# Patient Record
Sex: FEMALE | Race: WHITE
[De-identification: names, ages, dates, MRNs, and addresses within clinical notes are randomized per-mention and may not be internally consistent; named-entity substitution may affect disease eponyms.]

---

## 2017-03-13 ENCOUNTER — HOSPITAL ENCOUNTER (EMERGENCY)
Dept: HOSPITAL 26 - MED | Age: 22
Discharge: HOME | End: 2017-03-13
Payer: COMMERCIAL

## 2017-03-13 VITALS — SYSTOLIC BLOOD PRESSURE: 116 MMHG | DIASTOLIC BLOOD PRESSURE: 63 MMHG

## 2017-03-13 VITALS — SYSTOLIC BLOOD PRESSURE: 128 MMHG | DIASTOLIC BLOOD PRESSURE: 87 MMHG

## 2017-03-13 VITALS — WEIGHT: 140 LBS | HEIGHT: 62 IN | BODY MASS INDEX: 25.76 KG/M2

## 2017-03-13 DIAGNOSIS — K59.00: Primary | ICD-10-CM

## 2017-03-13 DIAGNOSIS — R03.0: ICD-10-CM

## 2017-03-13 PROCEDURE — 81002 URINALYSIS NONAUTO W/O SCOPE: CPT

## 2017-03-13 PROCEDURE — 96374 THER/PROPH/DIAG INJ IV PUSH: CPT

## 2017-03-13 PROCEDURE — 81025 URINE PREGNANCY TEST: CPT

## 2017-03-13 PROCEDURE — 99284 EMERGENCY DEPT VISIT MOD MDM: CPT

## 2017-03-13 PROCEDURE — 74022 RADEX COMPL AQT ABD SERIES: CPT

## 2017-03-13 PROCEDURE — 96375 TX/PRO/DX INJ NEW DRUG ADDON: CPT

## 2017-03-13 PROCEDURE — 96361 HYDRATE IV INFUSION ADD-ON: CPT

## 2017-03-13 NOTE — NUR
Patient discharged with v/s stable. Written and verbal after care instructions 
given and explained.Patient alert, oriented and verbalized understanding of 
instructions. Ambulatory with steady gait. All questions addressed prior to 
discharge. ID band removed. Patient advised to follow up with PMD. Rx of 
LACTULOSE, MAGNESIUM CITRATE, TRAMADOL given. Patient educated on indication of 
medication including possible reaction and side effects. Opportunity to ask 
questions provided and answered.

## 2017-03-13 NOTE — NUR
PATIENT PRESENTS TO ED WITH RLQ ABD PAIN X1 HOUR; DENIES V/D; SKIN IS 
PINK/WARM/DRY; AAOX4 WITH EVEN AND STEADY GAIT; LUNGS CLEAR BL; HR EVEN AND 
REGULAR; PT DENIES ANY FEVER, CP, SOB, OR COUGH AT THIS TIME; PATIENT STATES 
PAIN OF 10/10 AT THIS TIME; VSS; PATIENT POSITIONED FOR COMFORT; HOB ELEVATED; 
BEDRAILS UP X2; BED DOWN. ER MD MADE AWARE OF PT STATUS.

## 2017-08-07 ENCOUNTER — HOSPITAL ENCOUNTER (EMERGENCY)
Dept: HOSPITAL 26 - MED | Age: 22
Discharge: HOME | End: 2017-08-07
Payer: COMMERCIAL

## 2017-08-07 VITALS — HEIGHT: 57 IN | BODY MASS INDEX: 29.34 KG/M2 | WEIGHT: 136 LBS

## 2017-08-07 VITALS — SYSTOLIC BLOOD PRESSURE: 139 MMHG | DIASTOLIC BLOOD PRESSURE: 91 MMHG

## 2017-08-07 VITALS — DIASTOLIC BLOOD PRESSURE: 69 MMHG | SYSTOLIC BLOOD PRESSURE: 118 MMHG

## 2017-08-07 DIAGNOSIS — R11.2: ICD-10-CM

## 2017-08-07 DIAGNOSIS — R51: Primary | ICD-10-CM

## 2017-08-07 DIAGNOSIS — H53.149: ICD-10-CM

## 2017-08-07 PROCEDURE — 99284 EMERGENCY DEPT VISIT MOD MDM: CPT

## 2017-08-07 PROCEDURE — 70450 CT HEAD/BRAIN W/O DYE: CPT

## 2017-08-07 PROCEDURE — 96372 THER/PROPH/DIAG INJ SC/IM: CPT

## 2017-08-07 NOTE — NUR
Patient discharged with v/s stable. Written and verbal after care instructions 
given and explained. Patient alert, oriented and verbalized understanding of 
instructions. Ambulatory with steady gait. All questions addressed prior to 
discharge. ID band removed. Patient advised to follow up with PMD OR RETURN TO 
ER IF CONDITION WORSENS. Rx of  BENADRYL AND COMPAZINE given. Patient educated 
on indication of medication including possible reaction and side effects. 
Opportunity to ask questions provided and answered.

## 2017-08-07 NOTE — NUR
20 Y/O F C/O HEADACHE, N/V X 1 WK  PT SEEN PMD ON 8/4, HAS RX FOR IMITREX, 
SUDOGEST AND PROMETHAZINE. MED HX: NONE. PT RESTING COMFORTABLE, ER MD MADE 
AWARE.

## 2018-01-20 ENCOUNTER — HOSPITAL ENCOUNTER (EMERGENCY)
Dept: HOSPITAL 26 - MED | Age: 23
LOS: 1 days | Discharge: HOME | End: 2018-01-21
Payer: COMMERCIAL

## 2018-01-20 VITALS — DIASTOLIC BLOOD PRESSURE: 93 MMHG | SYSTOLIC BLOOD PRESSURE: 139 MMHG

## 2018-01-20 VITALS — HEIGHT: 55 IN | WEIGHT: 135.37 LBS | BODY MASS INDEX: 31.33 KG/M2

## 2018-01-20 DIAGNOSIS — R11.0: ICD-10-CM

## 2018-01-20 DIAGNOSIS — R10.9: Primary | ICD-10-CM

## 2018-01-20 LAB
ERYTHROCYTE [DISTWIDTH] IN BLOOD BY AUTOMATED COUNT: 12.8 % (ref 11.6–13.7)
HCT VFR BLD AUTO: 39.5 % (ref 36–48)
HGB BLD-MCNC: 13.2 G/DL (ref 12–16)
MCH RBC QN AUTO: 28 PG (ref 27–31)
MCHC RBC AUTO-ENTMCNC: 33 G/DL (ref 33–37)
MCV RBC AUTO: 84 FL (ref 80–94)
PLATELET # BLD AUTO: 307 K/UL (ref 140–450)
RBC # BLD AUTO: 4.7 MIL/UL (ref 4.2–5.4)
WBC # BLD AUTO: 9.9 K/UL (ref 4.8–10.8)

## 2018-01-20 PROCEDURE — 84702 CHORIONIC GONADOTROPIN TEST: CPT

## 2018-01-20 PROCEDURE — 80305 DRUG TEST PRSMV DIR OPT OBS: CPT

## 2018-01-20 PROCEDURE — 80053 COMPREHEN METABOLIC PANEL: CPT

## 2018-01-20 PROCEDURE — 85025 COMPLETE CBC W/AUTO DIFF WBC: CPT

## 2018-01-20 PROCEDURE — 96361 HYDRATE IV INFUSION ADD-ON: CPT

## 2018-01-20 PROCEDURE — 74177 CT ABD & PELVIS W/CONTRAST: CPT

## 2018-01-20 PROCEDURE — 81025 URINE PREGNANCY TEST: CPT

## 2018-01-20 PROCEDURE — G0482 DRUG TEST DEF 15-21 CLASSES: HCPCS

## 2018-01-20 PROCEDURE — 36415 COLL VENOUS BLD VENIPUNCTURE: CPT

## 2018-01-20 PROCEDURE — 83690 ASSAY OF LIPASE: CPT

## 2018-01-20 PROCEDURE — 96374 THER/PROPH/DIAG INJ IV PUSH: CPT

## 2018-01-20 PROCEDURE — 81001 URINALYSIS AUTO W/SCOPE: CPT

## 2018-01-20 PROCEDURE — 99285 EMERGENCY DEPT VISIT HI MDM: CPT

## 2018-01-20 PROCEDURE — 83605 ASSAY OF LACTIC ACID: CPT

## 2018-01-20 PROCEDURE — 96375 TX/PRO/DX INJ NEW DRUG ADDON: CPT

## 2018-01-21 VITALS — DIASTOLIC BLOOD PRESSURE: 85 MMHG | SYSTOLIC BLOOD PRESSURE: 130 MMHG

## 2018-01-21 LAB
ALBUMIN FLD-MCNC: 4.2 G/DL (ref 3.4–5)
ANION GAP SERPL CALCULATED.3IONS-SCNC: 10.8 MMOL/L (ref 8–16)
APPEARANCE UR: CLEAR
AST SERPL-CCNC: 15 U/L (ref 15–37)
BARBITURATES UR QL SCN: NEGATIVE NG/ML
BENZODIAZ UR QL SCN: NEGATIVE NG/ML
BILIRUB SERPL-MCNC: 0.3 MG/DL (ref 0–1)
BILIRUB UR QL STRIP: NEGATIVE
BUN SERPL-MCNC: 10 MG/DL (ref 7–18)
BZE UR QL SCN: NEGATIVE NG/ML
CANNABINOIDS UR QL SCN: NEGATIVE NG/ML
CHLORIDE SERPL-SCNC: 103 MMOL/L (ref 98–107)
CO2 SERPL-SCNC: 28.7 MMOL/L (ref 21–32)
COLOR UR: YELLOW
CREAT SERPL-MCNC: 1 MG/DL (ref 0.6–1.3)
GFR SERPL CREATININE-BSD FRML MDRD: 89 ML/MIN (ref 90–?)
GLUCOSE SERPL-MCNC: 111 MG/DL (ref 74–106)
GLUCOSE UR STRIP-MCNC: NEGATIVE MG/DL
HGB UR QL STRIP: (no result)
LEUKOCYTE ESTERASE UR QL STRIP: NEGATIVE
LIPASE SERPL-CCNC: 160 U/L (ref 73–393)
LYMPHOCYTES NFR BLD MANUAL: 14 % (ref 20–46)
MONOCYTES NFR BLD MANUAL: 3 % (ref 5–12)
NITRITE UR QL STRIP: NEGATIVE
OPIATES UR QL SCN: NEGATIVE NG/ML
PCP UR QL SCN: NEGATIVE NG/ML
PH UR STRIP: 7 [PH] (ref 5–9)
POTASSIUM SERPL-SCNC: 3.5 MMOL/L (ref 3.5–5.1)
RBC #/AREA URNS HPF: (no result) /HPF (ref 0–5)
SODIUM SERPL-SCNC: 139 MMOL/L (ref 136–145)
WBC,URINE: (no result) /HPF (ref 0–5)

## 2018-01-21 NOTE — NUR
Patient discharged with v/s stable. Pt states her pain is 0/10 at this time. 
Written and verbal after care instructions given and explained. 

Patient verbalized understanding. Ambulatory with steady gait. All questions 
addressed prior to discharge. Advised to follow up with PMD.

## 2018-01-21 NOTE — NUR
PT URINE NOT COLLECTED. PT UNABLE TO PROVIDE SAMPLE. DR HERRERA GAVE ORDER FOR 
HCG ANALYSIS VIA BLOOD SAMPLE. PT IN BED RESTING WITH HOB ELEVATED. FATER AT 
BEDSIDE. PAIN DECREASED TO 5/10. A&OX4. MD AWARE. CONTINUE TO MONITOR.

## 2018-01-21 NOTE — NUR
Md at bedside. Performed bedside US of bladder. Awaiting labs. continue to 
monitor. Pt states she is confortable. Father at bedside. Pt in poc with HOB 
elevated. Continue to monitor.

## 2018-01-21 NOTE — NUR
PT ARRIVED AT ED WITH FATER C/O UPPER ABDOMINAL AND LOWER BACK PAIN SINCE LAST 
NIGHT. PT STATES PAIN WITH AMBULATION. ABDOMEN SOFT AND TENDER. VSS. PT DENIES 
N/V/D AND STATES HAVING NORMAL GI AND  HABBITS. PT IN BED IN POC WITH HOB 
ELEVATED. MD AWARE. CONTINUE TO MONITOR.

## 2018-06-07 ENCOUNTER — HOSPITAL ENCOUNTER (EMERGENCY)
Dept: HOSPITAL 26 - MED | Age: 23
LOS: 1 days | Discharge: HOME | End: 2018-06-08
Payer: COMMERCIAL

## 2018-06-07 VITALS — BODY MASS INDEX: 26.91 KG/M2 | HEIGHT: 61 IN | WEIGHT: 142.5 LBS

## 2018-06-07 VITALS — SYSTOLIC BLOOD PRESSURE: 139 MMHG | DIASTOLIC BLOOD PRESSURE: 82 MMHG

## 2018-06-07 DIAGNOSIS — R10.12: Primary | ICD-10-CM

## 2018-06-07 LAB
ALBUMIN FLD-MCNC: 3.9 G/DL (ref 3.4–5)
ANION GAP SERPL CALCULATED.3IONS-SCNC: 11.8 MMOL/L (ref 8–16)
APPEARANCE UR: (no result)
AST SERPL-CCNC: 19 U/L (ref 15–37)
BASOPHILS # BLD AUTO: 0 K/UL (ref 0–0.22)
BASOPHILS NFR BLD AUTO: 0.3 % (ref 0–2)
BILIRUB SERPL-MCNC: 0.1 MG/DL (ref 0–1)
BILIRUB UR QL STRIP: NEGATIVE
BUN SERPL-MCNC: 11 MG/DL (ref 7–18)
CHLORIDE SERPL-SCNC: 104 MMOL/L (ref 98–107)
CO2 SERPL-SCNC: 29.5 MMOL/L (ref 21–32)
COLOR UR: YELLOW
CREAT SERPL-MCNC: 0.8 MG/DL (ref 0.6–1.3)
EOSINOPHIL # BLD AUTO: 0.1 K/UL (ref 0–0.4)
EOSINOPHIL NFR BLD AUTO: 0.7 % (ref 0–4)
ERYTHROCYTE [DISTWIDTH] IN BLOOD BY AUTOMATED COUNT: 13.8 % (ref 11.6–13.7)
GFR SERPL CREATININE-BSD FRML MDRD: 115 ML/MIN (ref 90–?)
GLUCOSE SERPL-MCNC: 114 MG/DL (ref 74–106)
GLUCOSE UR STRIP-MCNC: NEGATIVE MG/DL
HCT VFR BLD AUTO: 39.7 % (ref 36–48)
HGB BLD-MCNC: 12.9 G/DL (ref 12–16)
HGB UR QL STRIP: (no result)
LEUKOCYTE ESTERASE UR QL STRIP: NEGATIVE
LIPASE SERPL-CCNC: 285 U/L (ref 73–393)
LYMPHOCYTES # BLD AUTO: 1.3 K/UL (ref 2.5–16.5)
LYMPHOCYTES NFR BLD AUTO: 16.9 % (ref 20.5–51.1)
MCH RBC QN AUTO: 27 PG (ref 27–31)
MCHC RBC AUTO-ENTMCNC: 32 G/DL (ref 33–37)
MCV RBC AUTO: 84.2 FL (ref 80–94)
MONOCYTES # BLD AUTO: 0.5 K/UL (ref 0.8–1)
MONOCYTES NFR BLD AUTO: 6.6 % (ref 1.7–9.3)
NEUTROPHILS # BLD AUTO: 6 K/UL (ref 1.8–7.7)
NEUTROPHILS NFR BLD AUTO: 75.5 % (ref 42.2–75.2)
NITRITE UR QL STRIP: NEGATIVE
PH UR STRIP: 7.5 [PH] (ref 5–9)
PLATELET # BLD AUTO: 332 K/UL (ref 140–450)
POTASSIUM SERPL-SCNC: 4.3 MMOL/L (ref 3.5–5.1)
RBC # BLD AUTO: 4.71 MIL/UL (ref 4.2–5.4)
RBC #/AREA URNS HPF: (no result) /HPF (ref 0–5)
SODIUM SERPL-SCNC: 141 MMOL/L (ref 136–145)
WBC # BLD AUTO: 7.9 K/UL (ref 4.8–10.8)
WBC,URINE: (no result) /HPF (ref 0–5)

## 2018-06-07 PROCEDURE — 85025 COMPLETE CBC W/AUTO DIFF WBC: CPT

## 2018-06-07 PROCEDURE — S0119 ONDANSETRON 4 MG: HCPCS

## 2018-06-07 PROCEDURE — 96372 THER/PROPH/DIAG INJ SC/IM: CPT

## 2018-06-07 PROCEDURE — 36415 COLL VENOUS BLD VENIPUNCTURE: CPT

## 2018-06-07 PROCEDURE — 87086 URINE CULTURE/COLONY COUNT: CPT

## 2018-06-07 PROCEDURE — 99285 EMERGENCY DEPT VISIT HI MDM: CPT

## 2018-06-07 PROCEDURE — 84703 CHORIONIC GONADOTROPIN ASSAY: CPT

## 2018-06-07 PROCEDURE — 83690 ASSAY OF LIPASE: CPT

## 2018-06-07 PROCEDURE — 81025 URINE PREGNANCY TEST: CPT

## 2018-06-07 PROCEDURE — 81001 URINALYSIS AUTO W/SCOPE: CPT

## 2018-06-07 PROCEDURE — 74176 CT ABD & PELVIS W/O CONTRAST: CPT

## 2018-06-07 PROCEDURE — 80053 COMPREHEN METABOLIC PANEL: CPT

## 2018-06-07 NOTE — NUR
PT C/O LUQ PAIN X4 HOURS. 

ABD IS ROUND, SOFT, NON TENDER, HYPOACTIVE BS X4. PT STATES PAIN 10/0 RADIATING 
TO LEFT FLANK. 



NO PMH, NKDA

## 2018-06-08 VITALS — SYSTOLIC BLOOD PRESSURE: 130 MMHG | DIASTOLIC BLOOD PRESSURE: 80 MMHG

## 2018-06-08 NOTE — NUR
Patient discharged with v/s stable. Written and verbal after care instructions 
given and explained. 

Patient alert, oriented and verbalized understanding of instructions. 
Ambulatory with steady gait. All questions addressed prior to discharge. ID 
band removed. Patient advised to follow up with PMD. Rx of MYLANTA given. 
Patient educated on indication of medication including possible reaction and 
side effects. Opportunity to ask questions provided and answered.

## 2019-12-09 ENCOUNTER — HOSPITAL ENCOUNTER (EMERGENCY)
Dept: HOSPITAL 26 - MED | Age: 24
Discharge: HOME | End: 2019-12-09
Payer: COMMERCIAL

## 2019-12-09 VITALS — DIASTOLIC BLOOD PRESSURE: 88 MMHG | SYSTOLIC BLOOD PRESSURE: 136 MMHG

## 2019-12-09 VITALS — WEIGHT: 150 LBS | HEIGHT: 59 IN | BODY MASS INDEX: 30.24 KG/M2

## 2019-12-09 DIAGNOSIS — E07.9: ICD-10-CM

## 2019-12-09 DIAGNOSIS — R11.2: ICD-10-CM

## 2019-12-09 DIAGNOSIS — R10.13: Primary | ICD-10-CM

## 2019-12-09 DIAGNOSIS — Z79.899: ICD-10-CM

## 2019-12-09 LAB
ALBUMIN FLD-MCNC: 3.9 G/DL (ref 3.4–5)
AMYLASE SERPL-CCNC: 58 U/L (ref 25–115)
ANION GAP SERPL CALCULATED.3IONS-SCNC: 14.9 MMOL/L (ref 8–16)
AST SERPL-CCNC: 11 U/L (ref 15–37)
BASOPHILS # BLD AUTO: 0 K/UL (ref 0–0.22)
BASOPHILS NFR BLD AUTO: 0.1 % (ref 0–2)
BILIRUB SERPL-MCNC: 0.5 MG/DL (ref 0–1)
BUN SERPL-MCNC: 13 MG/DL (ref 7–18)
CHLORIDE SERPL-SCNC: 104 MMOL/L (ref 98–107)
CO2 SERPL-SCNC: 25.8 MMOL/L (ref 21–32)
CREAT SERPL-MCNC: 0.7 MG/DL (ref 0.6–1.3)
EOSINOPHIL # BLD AUTO: 0 K/UL (ref 0–0.4)
EOSINOPHIL NFR BLD AUTO: 0.1 % (ref 0–4)
ERYTHROCYTE [DISTWIDTH] IN BLOOD BY AUTOMATED COUNT: 14 % (ref 11.6–13.7)
GFR SERPL CREATININE-BSD FRML MDRD: 132 ML/MIN (ref 90–?)
GLUCOSE SERPL-MCNC: 110 MG/DL (ref 74–106)
HCT VFR BLD AUTO: 41 % (ref 36–48)
HGB BLD-MCNC: 13.2 G/DL (ref 12–16)
LIPASE SERPL-CCNC: 215 U/L (ref 73–393)
LYMPHOCYTES # BLD AUTO: 0.4 K/UL (ref 2.5–16.5)
LYMPHOCYTES NFR BLD AUTO: 4.1 % (ref 20.5–51.1)
MCH RBC QN AUTO: 27 PG (ref 27–31)
MCHC RBC AUTO-ENTMCNC: 32 G/DL (ref 33–37)
MCV RBC AUTO: 84.2 FL (ref 80–94)
MONOCYTES # BLD AUTO: 0.4 K/UL (ref 0.8–1)
MONOCYTES NFR BLD AUTO: 4 % (ref 1.7–9.3)
NEUTROPHILS # BLD AUTO: 9.5 K/UL (ref 1.8–7.7)
NEUTROPHILS NFR BLD AUTO: 91.7 % (ref 42.2–75.2)
PLATELET # BLD AUTO: 335 K/UL (ref 140–450)
POTASSIUM SERPL-SCNC: 3.7 MMOL/L (ref 3.5–5.1)
RBC # BLD AUTO: 4.86 MIL/UL (ref 4.2–5.4)
SODIUM SERPL-SCNC: 141 MMOL/L (ref 136–145)
WBC # BLD AUTO: 10.4 K/UL (ref 4.8–10.8)

## 2019-12-09 PROCEDURE — 96374 THER/PROPH/DIAG INJ IV PUSH: CPT

## 2019-12-09 PROCEDURE — 81002 URINALYSIS NONAUTO W/O SCOPE: CPT

## 2019-12-09 PROCEDURE — 80053 COMPREHEN METABOLIC PANEL: CPT

## 2019-12-09 PROCEDURE — 99283 EMERGENCY DEPT VISIT LOW MDM: CPT

## 2019-12-09 PROCEDURE — 85025 COMPLETE CBC W/AUTO DIFF WBC: CPT

## 2019-12-09 PROCEDURE — 83690 ASSAY OF LIPASE: CPT

## 2019-12-09 PROCEDURE — 36415 COLL VENOUS BLD VENIPUNCTURE: CPT

## 2019-12-09 PROCEDURE — 84703 CHORIONIC GONADOTROPIN ASSAY: CPT

## 2019-12-09 PROCEDURE — 82150 ASSAY OF AMYLASE: CPT

## 2019-12-09 PROCEDURE — 96361 HYDRATE IV INFUSION ADD-ON: CPT

## 2019-12-09 NOTE — NUR
Patient discharged with v/s stable. Written and verbal after care instructions 
given to parents and patient, and explained. 

Patient verbalized understanding. Ambulatory with steady gait. All questions 
addressed prior to discharge. Advised to follow up with PMD.

## 2019-12-09 NOTE — NUR
PATIENT PRESENTS TO ED WITH ABD PAIN X 2 DAYS. PT STATES EPIGASTRIC PAIN THAT 
RADIATES TO BI LATERAL FLANK. PT STATES SHE HAS BURING WHEN URINATING. PT 
DENIES FREQUENCY OR URGENCY. PT DENIES PREGNANCY LMP 11/20. PT STATES N/V 
DENIES DIARRHEA. ; SKIN IS PINK/WARM/DRY; AAOX4 WITH EVEN AND STEADY GAIT; 
LUNGS CLEAR BL; HR EVEN AND REGULAR; PT DENIES ANY FEVER, CP, SOB, OR COUGH AT 
THIS TIME; PATIENT STATES PAIN OF 10/10 AT THIS TIME; VSS; PATIENT POSITIONED 
FOR COMFORT; HOB ELEVATED; BEDRAILS UP X2; BED DOWN. ER MD MADE AWARE OF PT 
STATUS.

## 2020-04-28 ENCOUNTER — HOSPITAL ENCOUNTER (EMERGENCY)
Dept: HOSPITAL 26 - MED | Age: 25
Discharge: HOME | End: 2020-04-28
Payer: COMMERCIAL

## 2020-04-28 VITALS — WEIGHT: 160 LBS | HEIGHT: 66 IN | BODY MASS INDEX: 25.71 KG/M2

## 2020-04-28 VITALS — SYSTOLIC BLOOD PRESSURE: 136 MMHG | DIASTOLIC BLOOD PRESSURE: 80 MMHG

## 2020-04-28 VITALS — DIASTOLIC BLOOD PRESSURE: 80 MMHG | SYSTOLIC BLOOD PRESSURE: 136 MMHG

## 2020-04-28 DIAGNOSIS — J06.9: Primary | ICD-10-CM

## 2020-04-28 DIAGNOSIS — J45.909: ICD-10-CM

## 2020-04-28 DIAGNOSIS — Z79.899: ICD-10-CM

## 2020-04-28 DIAGNOSIS — E07.9: ICD-10-CM

## 2020-04-28 PROCEDURE — 99283 EMERGENCY DEPT VISIT LOW MDM: CPT

## 2020-04-28 PROCEDURE — 96372 THER/PROPH/DIAG INJ SC/IM: CPT

## 2020-04-28 NOTE — NUR
25 Y/O FEMALE C/O COUGH/SOB X3 DAYS. PT REPORTS BODY ACHES AND SUBJECTIVE 
FEVER, STATES "I DONT FEEL WELL". RESP EVEN AND UNLABORED. LUNG SOUNDS CLEAR IN 
BILAT LOBES. NO RESP DISTRESS NOTED AT THIS TIME. DENIES N/V/D. DENIES ANY 
CONTACT WITH POSSIBLE COVID PTS. SKIN WARM/DRY. PT IS CURRENTLY IN COVID TENT 
AT THIS TIME. AAOX4. CAP REFILL <3. 

PMH: CHILDHOOD ASTHMA

NKA

## 2020-06-11 ENCOUNTER — HOSPITAL ENCOUNTER (EMERGENCY)
Dept: HOSPITAL 26 - MED | Age: 25
Discharge: HOME | End: 2020-06-11
Payer: COMMERCIAL

## 2020-06-11 VITALS — WEIGHT: 145 LBS | HEIGHT: 59 IN | BODY MASS INDEX: 29.23 KG/M2

## 2020-06-11 VITALS — SYSTOLIC BLOOD PRESSURE: 147 MMHG | DIASTOLIC BLOOD PRESSURE: 96 MMHG

## 2020-06-11 DIAGNOSIS — L60.0: Primary | ICD-10-CM

## 2020-06-11 DIAGNOSIS — Z79.899: ICD-10-CM

## 2020-06-11 DIAGNOSIS — J45.909: ICD-10-CM

## 2020-06-11 DIAGNOSIS — E07.9: ICD-10-CM

## 2020-06-11 PROCEDURE — 99283 EMERGENCY DEPT VISIT LOW MDM: CPT

## 2020-06-11 NOTE — NUR
24F PT PRESENTS TO ED WITH C/O RT HALLUX PAIN. TOE NAIL PARTIALLY COMING OFF 
S/P GETTING HIT BY THE CORNER EDGE OF BATHROOM DOOR. 

RR EVEN AND UNLABORED. 

DENIES SOB/COUGH. 



NEGATIVE FOR COVID SCREENING. 

PMHX; DENIES

RX: DENIES

## 2021-11-29 ENCOUNTER — HOSPITAL ENCOUNTER (EMERGENCY)
Dept: HOSPITAL 26 - MED | Age: 26
Discharge: HOME | End: 2021-11-29
Payer: COMMERCIAL

## 2021-11-29 VITALS — HEIGHT: 60 IN | WEIGHT: 169 LBS | BODY MASS INDEX: 33.18 KG/M2

## 2021-11-29 VITALS — WEIGHT: 169 LBS | HEIGHT: 59 IN | BODY MASS INDEX: 34.07 KG/M2

## 2021-11-29 VITALS — DIASTOLIC BLOOD PRESSURE: 90 MMHG | SYSTOLIC BLOOD PRESSURE: 142 MMHG

## 2021-11-29 VITALS — SYSTOLIC BLOOD PRESSURE: 142 MMHG | DIASTOLIC BLOOD PRESSURE: 91 MMHG

## 2021-11-29 VITALS — DIASTOLIC BLOOD PRESSURE: 91 MMHG | SYSTOLIC BLOOD PRESSURE: 142 MMHG

## 2021-11-29 VITALS — DIASTOLIC BLOOD PRESSURE: 73 MMHG | SYSTOLIC BLOOD PRESSURE: 135 MMHG

## 2021-11-29 DIAGNOSIS — K29.70: Primary | ICD-10-CM

## 2021-11-29 DIAGNOSIS — J45.909: ICD-10-CM

## 2021-11-29 DIAGNOSIS — E06.9: ICD-10-CM

## 2021-11-29 DIAGNOSIS — J45.901: ICD-10-CM

## 2021-11-29 LAB
ALBUMIN FLD-MCNC: 3.7 G/DL (ref 3.4–5)
ANION GAP SERPL CALCULATED.3IONS-SCNC: 12.8 MMOL/L (ref 8–16)
AST SERPL-CCNC: 19 U/L (ref 15–37)
BASOPHILS # BLD AUTO: 0 K/UL (ref 0–0.22)
BASOPHILS NFR BLD AUTO: 0.1 % (ref 0–2)
BILIRUB SERPL-MCNC: 0.2 MG/DL (ref 0–1)
BUN SERPL-MCNC: 8 MG/DL (ref 7–18)
CHLORIDE SERPL-SCNC: 106 MMOL/L (ref 98–107)
CO2 SERPL-SCNC: 27.9 MMOL/L (ref 21–32)
CREAT SERPL-MCNC: 0.8 MG/DL (ref 0.6–1.3)
EOSINOPHIL # BLD AUTO: 0 K/UL (ref 0–0.4)
EOSINOPHIL NFR BLD AUTO: 0.5 % (ref 0–4)
ERYTHROCYTE [DISTWIDTH] IN BLOOD BY AUTOMATED COUNT: 14.7 % (ref 11.6–13.7)
GFR SERPL CREATININE-BSD FRML MDRD: 112 ML/MIN (ref 90–?)
GLUCOSE SERPL-MCNC: 110 MG/DL (ref 74–106)
HCT VFR BLD AUTO: 38.5 % (ref 36–48)
HGB BLD-MCNC: 12.5 G/DL (ref 12–16)
LIPASE SERPL-CCNC: 163 U/L (ref 73–393)
LYMPHOCYTES # BLD AUTO: 0.8 K/UL (ref 2.5–16.5)
LYMPHOCYTES NFR BLD AUTO: 10 % (ref 20.5–51.1)
MCH RBC QN AUTO: 27 PG (ref 27–31)
MCHC RBC AUTO-ENTMCNC: 33 G/DL (ref 33–37)
MCV RBC AUTO: 82 FL (ref 80–94)
MONOCYTES # BLD AUTO: 0.4 K/UL (ref 0.8–1)
MONOCYTES NFR BLD AUTO: 4.4 % (ref 1.7–9.3)
NEUTROPHILS # BLD AUTO: 7.2 K/UL (ref 1.8–7.7)
NEUTROPHILS NFR BLD AUTO: 85 % (ref 42.2–75.2)
PLATELET # BLD AUTO: 351 K/UL (ref 140–450)
POTASSIUM SERPL-SCNC: 3.7 MMOL/L (ref 3.5–5.1)
RBC # BLD AUTO: 4.7 MIL/UL (ref 4.2–5.4)
SODIUM SERPL-SCNC: 143 MMOL/L (ref 136–145)
WBC # BLD AUTO: 8.4 K/UL (ref 4.8–10.8)

## 2021-11-29 PROCEDURE — 99284 EMERGENCY DEPT VISIT MOD MDM: CPT

## 2021-11-29 PROCEDURE — 76705 ECHO EXAM OF ABDOMEN: CPT

## 2021-11-29 PROCEDURE — 96372 THER/PROPH/DIAG INJ SC/IM: CPT

## 2021-11-29 PROCEDURE — 81002 URINALYSIS NONAUTO W/O SCOPE: CPT

## 2021-11-29 PROCEDURE — 80053 COMPREHEN METABOLIC PANEL: CPT

## 2021-11-29 PROCEDURE — 81025 URINE PREGNANCY TEST: CPT

## 2021-11-29 PROCEDURE — 83690 ASSAY OF LIPASE: CPT

## 2021-11-29 PROCEDURE — 85025 COMPLETE CBC W/AUTO DIFF WBC: CPT

## 2021-11-29 PROCEDURE — 36415 COLL VENOUS BLD VENIPUNCTURE: CPT

## 2021-11-29 NOTE — NUR
PT IS A 27 Y/O F BIB FATHER FOR VOMITING/NAUSEA. PT WAS SEEN EARLIER TODAY FOR 
SAME THING AND STATED ONCE SHE RETURNED HOME AND MED WORE OFF SHE FELT WORSE. 
PT HAS BEEN DRINKING ELECTORLYTES BUT THROWS UP RIGHT AFTER. PT HAS PAIN 
RADIATING FROM UPPER ABDOMEN TO BACK. PT HAS NKA AND NO PREVIOUS MEDICAL HX.

## 2021-11-29 NOTE — NUR
Patient discharged with v/s stable. Written and verbal after care instructions 
given and explained. 

Patient alert, oriented and verbalized understanding of instructions. 
Ambulatory with steady gait. All questions addressed prior to discharge. ID 
band removed. Patient advised to follow up with PMD. Rx of 
HYDROCODON/ACETAMETAPHIN given. Opportunity to ask questions provided and 
answered.

## 2021-11-29 NOTE — NUR
25 Y/O F BIB UNCLE FROM HOME, C/O EPIGASTRIC PAIN FOR 2 DAYS. BURNING SENSATION 
WITH N/V/D, DIARRHEA BM 2 HOURS PRIOR TO ARRIVAL. DENIES DYSURIA, HEMATURIA. 
DENIES CP, SOB, SORE THROAT, COUGH OR FEVERS. DENIES ANYONE IN HOUSEHOLD SICK 
WITH SAME SYMPTOMS AT THIS TIME. PT STATES 10/10 PAIN. ABD SOFT NON TENDER. 



PMH: HYPOTHYROID

NKA

MED: TYLENOL, LEVOTHYROXINE

## 2021-11-29 NOTE — NUR
Patient discharged with v/s stable. Written and verbal after care instructions 
given and explained. 

Patient alert, oriented and verbalized understanding of instructions. 
Ambulatory with steady gait. All questions addressed prior to discharge. ID 
band removed. Rx of PEPCID AND ZOFRAN given.

## 2022-02-27 ENCOUNTER — HOSPITAL ENCOUNTER (EMERGENCY)
Dept: HOSPITAL 26 - MED | Age: 27
Discharge: HOME | End: 2022-02-27
Payer: COMMERCIAL

## 2022-02-27 VITALS — BODY MASS INDEX: 31.53 KG/M2 | HEIGHT: 61 IN | WEIGHT: 167 LBS

## 2022-02-27 VITALS — DIASTOLIC BLOOD PRESSURE: 99 MMHG | SYSTOLIC BLOOD PRESSURE: 146 MMHG

## 2022-02-27 VITALS — DIASTOLIC BLOOD PRESSURE: 75 MMHG | SYSTOLIC BLOOD PRESSURE: 137 MMHG

## 2022-02-27 DIAGNOSIS — R11.10: ICD-10-CM

## 2022-02-27 DIAGNOSIS — K29.70: Primary | ICD-10-CM

## 2022-02-27 LAB
ALBUMIN FLD-MCNC: 3.9 G/DL (ref 3.4–5)
ANION GAP SERPL CALCULATED.3IONS-SCNC: 12.4 MMOL/L (ref 8–16)
APPEARANCE UR: (no result)
AST SERPL-CCNC: 24 U/L (ref 15–37)
BASOPHILS # BLD AUTO: 0 K/UL (ref 0–0.22)
BASOPHILS NFR BLD AUTO: 0.3 % (ref 0–2)
BILIRUB SERPL-MCNC: 0.3 MG/DL (ref 0–1)
BILIRUB UR QL STRIP: NEGATIVE
BUN SERPL-MCNC: 9 MG/DL (ref 7–18)
CHLORIDE SERPL-SCNC: 104 MMOL/L (ref 98–107)
CO2 SERPL-SCNC: 28.4 MMOL/L (ref 21–32)
COLOR UR: YELLOW
CREAT SERPL-MCNC: 0.7 MG/DL (ref 0.6–1.3)
EOSINOPHIL # BLD AUTO: 0 K/UL (ref 0–0.4)
EOSINOPHIL NFR BLD AUTO: 0.2 % (ref 0–4)
ERYTHROCYTE [DISTWIDTH] IN BLOOD BY AUTOMATED COUNT: 15.3 % (ref 11.6–13.7)
GFR SERPL CREATININE-BSD FRML MDRD: 130 ML/MIN (ref 90–?)
GLUCOSE SERPL-MCNC: 111 MG/DL (ref 74–106)
GLUCOSE UR STRIP-MCNC: NEGATIVE MG/DL
HCT VFR BLD AUTO: 37.9 % (ref 36–48)
HGB BLD-MCNC: 12.4 G/DL (ref 12–16)
HGB UR QL STRIP: (no result)
LEUKOCYTE ESTERASE UR QL STRIP: NEGATIVE
LIPASE SERPL-CCNC: 148 U/L (ref 73–393)
LYMPHOCYTES # BLD AUTO: 0.9 K/UL (ref 2.5–16.5)
LYMPHOCYTES NFR BLD AUTO: 9 % (ref 20.5–51.1)
MCH RBC QN AUTO: 27 PG (ref 27–31)
MCHC RBC AUTO-ENTMCNC: 33 G/DL (ref 33–37)
MCV RBC AUTO: 82.3 FL (ref 80–94)
MONOCYTES # BLD AUTO: 0.4 K/UL (ref 0.8–1)
MONOCYTES NFR BLD AUTO: 3.7 % (ref 1.7–9.3)
NEUTROPHILS # BLD AUTO: 9.1 K/UL (ref 1.8–7.7)
NEUTROPHILS NFR BLD AUTO: 86.8 % (ref 42.2–75.2)
NITRITE UR QL STRIP: NEGATIVE
PH UR STRIP: 6 [PH] (ref 5–9)
PLATELET # BLD AUTO: 357 K/UL (ref 140–450)
POTASSIUM SERPL-SCNC: 3.8 MMOL/L (ref 3.5–5.1)
RBC # BLD AUTO: 4.61 MIL/UL (ref 4.2–5.4)
RBC #/AREA URNS HPF: (no result) /HPF (ref 0–5)
SODIUM SERPL-SCNC: 141 MMOL/L (ref 136–145)
WBC # BLD AUTO: 10.5 K/UL (ref 4.8–10.8)
WBC,URINE: (no result) /HPF (ref 0–5)

## 2022-02-27 PROCEDURE — 85025 COMPLETE CBC W/AUTO DIFF WBC: CPT

## 2022-02-27 PROCEDURE — 80053 COMPREHEN METABOLIC PANEL: CPT

## 2022-02-27 PROCEDURE — 96374 THER/PROPH/DIAG INJ IV PUSH: CPT

## 2022-02-27 PROCEDURE — 81025 URINE PREGNANCY TEST: CPT

## 2022-02-27 PROCEDURE — 87086 URINE CULTURE/COLONY COUNT: CPT

## 2022-02-27 PROCEDURE — 96375 TX/PRO/DX INJ NEW DRUG ADDON: CPT

## 2022-02-27 PROCEDURE — 83690 ASSAY OF LIPASE: CPT

## 2022-02-27 PROCEDURE — 81001 URINALYSIS AUTO W/SCOPE: CPT

## 2022-02-27 PROCEDURE — 96361 HYDRATE IV INFUSION ADD-ON: CPT

## 2022-02-27 PROCEDURE — 99284 EMERGENCY DEPT VISIT MOD MDM: CPT

## 2022-02-27 PROCEDURE — 36415 COLL VENOUS BLD VENIPUNCTURE: CPT

## 2022-02-27 NOTE — NUR
Patient discharged with v/s stable. Written and verbal after care instructions 
given and explained. 

Patient alert, oriented and verbalized understanding of instructions. 
Ambulatory with steady gait. All questions addressed prior to discharge. ID 
band removed. Patient advised to follow up with PMD. Rx of MACROBID, PEPCID, 
ZOFRAN given. Patient educated on indication of medication including possible 
reaction and side effects. Opportunity to ask questions provided and answered.

## 2022-02-27 NOTE — NUR
26/F BIB SELF WITH C/O ABDOMINAL PAIN, N/V/D SINCE THIS MORNING. STATES SHE HAS 
HAD 3 EPISODES OF VOMITING, REPORTS TAKING PEPTO, ZOFRAN AND PEPCID WITH NO 
RELIEF. DENIES CP, DYSURIA OR FEVERS. 



MEDHX: HYPOTHYROIDISM

ALLERGIES: NKA

## 2022-03-09 NOTE — NUR
26/F BIB MOTHER, AA&OX4, AMBULATORY W/ STEADY GAIT; PRESENTS TO ED WITH C/O R 
SIDED FLANK AND BACK PAIN10/10 X 2 DAYS. PATIENT STATES PAIN IS "CONSTANT, 
SHARP AND FEELS LIKE A BURNING SENSATION." PATIENT TOOK TYLENOL TODAY W/O 
RELIEF. +CHILLS, NAUSEA, CONSTIPATION. PATIENT DENIES CP, SOB, FEVER, DYSURIA, 
HEMATURIA, TRAUMA/INJURY.



PMH: HYPOTHYROID

MEDS: LEVOTHYROXINE

NKA

## 2022-03-29 NOTE — NUR
RECEIVED PT IN BED LYING ON THEIR SIDE, A/OX4, RESPIRATIONS EVEN AND UNLABORED, 
CONT C/O OF NAUSEA AND ABD PAIN 8/10 DESCRIBED AS SHARP, EPIGASTRIC PAIN. PT 
MEDICATED WITH ZOFRAN 4MG AT 0546H.

## 2022-03-29 NOTE — NUR
Patient discharged with v/s stable. Written and verbal after care instructions 
given and explained. 

Patient alert, oriented and verbalized understanding of instructions. 
Ambulatory with steady gait. All questions addressed prior to discharge. ID 
band removed. Patient advised to follow up with PMD. Rx of CIPRO, PROMETHAZINE, 
IBUPROFEN given. Patient educated on indication of medication including 
possible reaction and side effects. Opportunity to ask questions provided and 
answered.

## 2022-11-17 NOTE — NUR
27/F WALKED IN C/O EPIGASTRIC PAIN ONSET 1 DAY. DENIES NVD. AFEBRILE AT TRIAGE. 
DENIES FALL OR TRAUMA. DENIES BLOOD IN STOOL. AAO4, AMBULATORY, ON MONITOR, 
BLOOD DRAWN BY PHLEBOTOMIST. 



PMHx : Hypothyroid, Chronic back pain, Chronic neck pain

## 2023-08-10 NOTE — NUR
**When to notify your Doctor**    Contact your doctor if you experience any of the following:    Pain, redness, swelling, or blistering near or at your administration site    Fever 100.4 or higher with or without chills    Nausea/ vomiting/ diarrhea not relieved with meds or is persistent    Develop mouth sores that prevent you from eating or drinking    Have black bowel movements, bruise easily, new rash, new headache, abdominal pain, swelling, or any signs of bleeding or new onset/ worsening shortness of breath    Have any new or worsening pain, numbness or tingling    Have any changes in your ability to perform daily activities, change in memory, or dizziness    Have any unexplained problems, questions, or concerns    Chemotherapy is in your body fluids for 48 hours- use precautions    It is safe for your family to have contact with you during treatment    Flush toilet twice after using, with lid closed    Your caregiver should wear gloves if handling your body fluids for 48 hours after your treatment, followed by thorough handwashing    Special precautions may be necessary if you are sexually active during your treatment (see additional info under “Chemotherapy” section)    [email or web address] is a helpful website for information    PT AMBULATED TO ER BED 5

## 2024-05-08 NOTE — NUR
pt in bed in poc with HOB elevated. states pain 2/10. fater at bedside. md 
aware. continue to monitor. What Type Of Note Output Would You Prefer (Optional)?: Standard Output What Is The Reason For Today's Visit?: Full Body Skin Examination with No Concerns